# Patient Record
Sex: FEMALE | Race: BLACK OR AFRICAN AMERICAN | NOT HISPANIC OR LATINO | URBAN - METROPOLITAN AREA
[De-identification: names, ages, dates, MRNs, and addresses within clinical notes are randomized per-mention and may not be internally consistent; named-entity substitution may affect disease eponyms.]

---

## 2022-02-25 ENCOUNTER — OFFICE VISIT (OUTPATIENT)
Dept: URBAN - METROPOLITAN AREA CLINIC 118 | Facility: CLINIC | Age: 22
End: 2022-02-25
Payer: COMMERCIAL

## 2022-02-25 ENCOUNTER — DASHBOARD ENCOUNTERS (OUTPATIENT)
Age: 22
End: 2022-02-25

## 2022-02-25 VITALS
SYSTOLIC BLOOD PRESSURE: 92 MMHG | TEMPERATURE: 97.7 F | WEIGHT: 117.8 LBS | DIASTOLIC BLOOD PRESSURE: 63 MMHG | BODY MASS INDEX: 23.75 KG/M2 | HEIGHT: 59 IN | HEART RATE: 71 BPM

## 2022-02-25 DIAGNOSIS — K21.9 GERD WITHOUT ESOPHAGITIS: ICD-10-CM

## 2022-02-25 DIAGNOSIS — R14.0 BLOATING: ICD-10-CM

## 2022-02-25 DIAGNOSIS — R68.81 EARLY SATIETY: ICD-10-CM

## 2022-02-25 DIAGNOSIS — K52.9 ENTEROCOLITIS: ICD-10-CM

## 2022-02-25 PROBLEM — 266435005: Status: ACTIVE | Noted: 2022-02-25

## 2022-02-25 PROCEDURE — 99203 OFFICE O/P NEW LOW 30 MIN: CPT | Performed by: INTERNAL MEDICINE

## 2022-02-25 RX ORDER — PANTOPRAZOLE SODIUM 40 MG/1
1 TABLET TABLET, DELAYED RELEASE ORAL ONCE A DAY
Qty: 30 TABLET | Refills: 1 | OUTPATIENT
Start: 2022-02-25

## 2022-02-25 NOTE — HPI-TODAY'S VISIT:
This is a 23 yo female with pmh of GERD here for evaluation after a recent ED visit at [facility].  Reports having abdominal pain, described as "rocks going through intestine" along with diarrhea and nausea/vomiting. She was evaluated at [facility] ED on 2022 and had labs, which showed leukocytosis at 12 and CT showing diffuse wall thickening of small and large intestine, enterocolitis. She was given antibiotics and lomotil. Reports similar symptoms back in 2018 during her pregnancy. She ended up having emergency .  She is not having diarrhea anymore. Complains of early satiey, fullness, and nausea. Also complains of abdominal bloating along with heartburn and indigestion.  She also had COVID with bodyache and fever 1 weeks prior to her ED evaluation.

## 2022-03-19 ENCOUNTER — ERX REFILL RESPONSE (OUTPATIENT)
Dept: URBAN - METROPOLITAN AREA CLINIC 118 | Facility: CLINIC | Age: 22
End: 2022-03-19

## 2022-03-19 LAB
DEAMIDATED GLIADIN ABS, IGA: 4
HEMATOCRIT: 36.5
HEMOGLOBIN: 11.8
IMMUNOGLOBULIN A, QN, SERUM: 316
MCH: 28.4
MCHC: 32.3
MCV: 88
NRBC: (no result)
PLATELETS: 201
RBC: 4.15
RDW: 13.1
T-TRANSGLUTAMINASE (TTG) IGA: <2
WBC: 7.9

## 2022-03-19 RX ORDER — PANTOPRAZOLE SODIUM 40 MG/1
TAKE 1 TABLET BY MOUTH EVERY DAY FOR 30 DAYS TABLET, DELAYED RELEASE ORAL
Qty: 90 TABLET | Refills: 1 | OUTPATIENT

## 2022-04-21 ENCOUNTER — OFFICE VISIT (OUTPATIENT)
Dept: URBAN - METROPOLITAN AREA CLINIC 118 | Facility: CLINIC | Age: 22
End: 2022-04-21